# Patient Record
Sex: MALE | Race: WHITE | HISPANIC OR LATINO | Employment: FULL TIME | URBAN - METROPOLITAN AREA
[De-identification: names, ages, dates, MRNs, and addresses within clinical notes are randomized per-mention and may not be internally consistent; named-entity substitution may affect disease eponyms.]

---

## 2021-10-11 ENCOUNTER — HOSPITAL ENCOUNTER (EMERGENCY)
Facility: MEDICAL CENTER | Age: 36
End: 2021-10-11
Attending: EMERGENCY MEDICINE

## 2021-10-11 VITALS
DIASTOLIC BLOOD PRESSURE: 88 MMHG | OXYGEN SATURATION: 98 % | BODY MASS INDEX: 32.76 KG/M2 | TEMPERATURE: 96.8 F | HEART RATE: 78 BPM | WEIGHT: 241.84 LBS | SYSTOLIC BLOOD PRESSURE: 138 MMHG | HEIGHT: 72 IN | RESPIRATION RATE: 16 BRPM

## 2021-10-11 DIAGNOSIS — M10.9 ACUTE GOUT OF RIGHT FOOT, UNSPECIFIED CAUSE: ICD-10-CM

## 2021-10-11 PROCEDURE — 99282 EMERGENCY DEPT VISIT SF MDM: CPT

## 2021-10-11 RX ORDER — INDOMETHACIN 50 MG/1
50 CAPSULE ORAL 3 TIMES DAILY
Qty: 90 CAPSULE | Refills: 0 | Status: SHIPPED | OUTPATIENT
Start: 2021-10-11 | End: 2021-10-21

## 2021-10-11 RX ORDER — HYDROCODONE BITARTRATE AND ACETAMINOPHEN 5; 325 MG/1; MG/1
1 TABLET ORAL EVERY 6 HOURS PRN
Qty: 12 TABLET | Refills: 0 | Status: SHIPPED | OUTPATIENT
Start: 2021-10-11 | End: 2021-10-11 | Stop reason: SDUPTHER

## 2021-10-11 RX ORDER — HYDROCODONE BITARTRATE AND ACETAMINOPHEN 5; 325 MG/1; MG/1
1 TABLET ORAL EVERY 6 HOURS PRN
Qty: 12 TABLET | Refills: 0 | Status: SHIPPED | OUTPATIENT
Start: 2021-10-11 | End: 2021-10-14

## 2021-10-11 RX ORDER — HYDROCODONE BITARTRATE AND ACETAMINOPHEN 5; 325 MG/1; MG/1
1 TABLET ORAL EVERY 6 HOURS PRN
Qty: 12 TABLET | Refills: 0 | Status: CANCELLED | OUTPATIENT
Start: 2021-10-11 | End: 2021-10-14

## 2021-10-11 RX ORDER — INDOMETHACIN 50 MG/1
50 CAPSULE ORAL 3 TIMES DAILY
Qty: 90 CAPSULE | Refills: 0 | Status: SHIPPED | OUTPATIENT
Start: 2021-10-11 | End: 2021-10-11 | Stop reason: SDUPTHER

## 2021-10-11 RX ORDER — INDOMETHACIN 50 MG/1
50 CAPSULE ORAL 3 TIMES DAILY
Qty: 90 CAPSULE | Refills: 0 | Status: CANCELLED | OUTPATIENT
Start: 2021-10-11 | End: 2021-10-21

## 2021-10-11 NOTE — Clinical Note
Pablo Mcmillan was seen and treated in our emergency department on 10/11/2021.  He may return to work on 10/13/2021.       If you have any questions or concerns, please don't hesitate to call.      Sonu Dumont M.D.

## 2021-10-11 NOTE — ED TRIAGE NOTES
Chief Complaint   Patient presents with   • Knee Pain   • Digit Pain     Pt to ED thought triage PMHx: Gout. Pt reports pain in R knee and great toe gradually increased this week. Pt rates pain 10/10 now affecting ambulation. VSS NAD

## 2021-10-11 NOTE — ED PROVIDER NOTES
ED Provider Note    ER PROVIDER NOTE      CHIEF COMPLAINT  Chief Complaint   Patient presents with   • Knee Pain   • Digit Pain       HPI  Pablo Mcmillan is a 36 y.o. male who presents to the emergency department complaining of right great toe and knee pain.  Patient reports his pain has been increasing over the last few days, has a longstanding history of gout and this feels the same.  He reports no injury to the areas.  No fevers.  No other joint or areas of pain.  He is currently in the area working from [place] so does not have a doctor in the area    REVIEW OF SYSTEMS  Pertinent positives include toe pain. Pertinent negatives include no fever. See HPI for details. All other systems reviewed and are negative.    PAST MEDICAL HISTORY   has a past medical history of Gout.    SOCIAL HISTORY  Social History     Tobacco Use   • Smoking status: Current Every Day Smoker     Types: Cigarettes   • Smokeless tobacco: Never Used   • Tobacco comment: 2-3 cigarretts daily    Substance Use Topics   • Alcohol use: Yes     Comment: socially    • Drug use: Never       SURGICAL HISTORY  patient denies any surgical history    CURRENT MEDICATIONS  Home Medications     Reviewed by Marimar Gallo R.N. (Registered Nurse) on 10/11/21 at 1319  Med List Status: Complete   Medication Last Dose Status        Patient Dano Taking any Medications                       ALLERGIES  No Known Allergies    PHYSICAL EXAM  VITAL SIGNS: /95   Pulse 78   Temp 35.9 °C (96.7 °F) (Temporal)   Resp 16   Ht 1.829 m (6')   Wt 110 kg (241 lb 13.5 oz)   SpO2 97%   BMI 32.80 kg/m²   Pulse ox interpretation: I interpret this pulse ox as normal.    Constitutional: Alert.  In no apparent distress.  HENT: Normocephalic, Atraumatic, Bilateral external ears normal. Nose normal.   Eyes: Pupils are equal and reactive. Conjunctiva normal, non-icteric.   Heart: Regular rate and rhythm, no murmurs.    Lungs: Clear to auscultation bilaterally.  Skin: Warm,  Dry, No erythema, No rash.   Musculoskeletal: There is tenderness over the MCP first digit on the right with some slight swelling, no erythema or excessive warmth, simply some tenderness over the lateral joint line of the right knee without erythema or excessive warmth.  Otherwise no tenderness or major deformities noted. No edema.  Distal capillary refill is less than 2 seconds, distal sensation is intact to light touch  Neurologic: Alert, Grossly non-focal.   Psychiatric: Affect normal, Judgment normal, Mood normal, Appears appropriate    DIAGNOSTIC STUDIES / PROCEDURES      RADIOLOGY  No orders to display     The radiologist's interpretation of all radiological studies have been reviewed and independently viewed by me.    COURSE & MEDICAL DECISION MAKING  Nursing notes, VS, PMSFHx reviewed in chart.    12:58 PM - Patient seen and examined at bedside.     Decision Making:  This is a 36 y.o. male presented with toe and knee pain.  Symptoms do seem consistent with gout and he has a longstanding history of this.  No fevers or findings suggestive of infectious etiology and has had no injury to the area to suggest fracture.  He will be given a prescription for indomethacin as well as a few days of Norco as he does not have a regular doctor in the area.  Additional resources for primary care follow-up if he remains in the area    I reviewed prescription monitoring program for patient's narcotic use before prescribing a scheduled drug.The patient will not drink alcohol nor drive with prescribed medications. The patient will return for new or worsening symptoms and is stable at the time of discharge.    The patient is referred to a primary physician for blood pressure management, diabetic screening, and for all other preventative health concerns.    In prescribing controlled substances to this patient, I certify that I have obtained and reviewed the medical history of Pablo Mcmillan. I have also made a good pamela effort  to obtain applicable records from other providers who have treated the patient and records did not demonstrate any increased risk of substance abuse that would prevent me from prescribing controlled substances.     I have conducted a physical exam and documented it. I have reviewed Mr. Mcmillan’s prescription history as maintained by the [place] Prescription Monitoring Program.     I have assessed the patient’s risk for abuse, dependency, and addiction using the validated Opioid Risk Tool available at https://www.mdcalc.com/zqfeei-vgsx-mfki-ort-narcotic-abuse.     Given the above, I believe the benefits of controlled substance therapy outweigh the risks. The reasons for prescribing controlled substances include non-narcotic, oral analgesic alternatives have been inadequate for pain control. Accordingly, I have discussed the risk and benefits, treatment plan, and alternative therapies with the patient.         DISPOSITION:  Patient will be discharged home in stable condition.    FOLLOW UP:  [address] [id number] [phone]  Schedule an appointment as soon as possible for a visit         OUTPATIENT MEDICATIONS:  New Prescriptions    HYDROCODONE-ACETAMINOPHEN (NORCO) 5-325 MG TAB PER TABLET    Take 1 Tablet by mouth every 6 hours as needed for up to 3 days.    INDOMETHACIN (INDOCIN) 50 MG CAP    Take 1 Capsule by mouth 3 times a day for 10 days.         FINAL IMPRESSION  1. Acute gout of right foot, unspecified cause        The note accurately reflects work and decisions made by me.  Sonu Dumont M.D.  10/11/2021  1:23 PM